# Patient Record
Sex: MALE | Race: WHITE | NOT HISPANIC OR LATINO
[De-identification: names, ages, dates, MRNs, and addresses within clinical notes are randomized per-mention and may not be internally consistent; named-entity substitution may affect disease eponyms.]

---

## 2022-03-02 ENCOUNTER — APPOINTMENT (OUTPATIENT)
Dept: ORTHOPEDIC SURGERY | Facility: CLINIC | Age: 62
End: 2022-03-02
Payer: COMMERCIAL

## 2022-03-02 ENCOUNTER — NON-APPOINTMENT (OUTPATIENT)
Age: 62
End: 2022-03-02

## 2022-03-02 PROBLEM — Z00.00 ENCOUNTER FOR PREVENTIVE HEALTH EXAMINATION: Status: ACTIVE | Noted: 2022-03-02

## 2022-03-02 PROCEDURE — 99204 OFFICE O/P NEW MOD 45 MIN: CPT

## 2022-03-02 NOTE — PHYSICAL EXAM
[de-identified] : General: Alert and oriented x3. In no acute distress. Pleasant in nature with a normal affect. No apparent respiratory distress.\par \par Left Foot Exam\par Skin: Clean, dry, intact\par Inspection: No obvious malalignment, no masses, no swelling, no effusion. Hallux Valgus. Hammertoes. \par Pulses: 2+ DP/PT pulses\par ROM: FOOT Full  ROM of digits, ANKLE 10 degrees of dorsiflexion, 40 degrees of plantarflexion, 10 degrees of subtalar motion.\par Painful ROM: None\par Tenderness: +Pain over the metatarsal heads. No tenderness over the medial malleolus, No tenderness over the lateral malleolus, no CFL/ATFL/PTFL pain, no deltoid ligament pain. No heel pain. No Achilles tenderness. No 5th metatarsal pain. No pain to the LisFranc joint. No ttp over the posterior tibial tendon.\par Stability: Negative anterior/posterior drawer.\par Strength: 5/5 ADD/ABD/TA/GS/EHL/FHL/EDL\par Neuro: Sensation in tact to light touch throughout\par Additional tests: Negative Mortons test, negative tarsal tunnel tinels, negative single heel rise.	 [de-identified] : Xrays of left foot obtained from outside facility on 2021 and reviewed in the office today, 2022 , revealed: Hallux Valgus. Hallux Rigidus. Bipartite sesamoid. No acute fractures. \par \par \par Office Location: [address] [place] [address]\par Office Phone: [phone]\par Office Fax: [phone]\par PATIENT NAME: Korey Thomas\par PATIENT PHONE NUMBER:\par PATIENT ID: [id number]\par : 1960\par DATE OF EXAM: 2021\par R. Phys. Name: NiltonGeoffGaetanoIAN\par R. Phys. Address: [address] [place] [address]\par R. Phys. Phone: [phone]\par MRI-3T LEFT FOOT NON CONTRAST\par \par History: Left foot pain between 2nd and 3rd interspace.\par \par ICD- M79.672 Left foot pain R20.0 Numbness Lower/Upper Extremity M25.675 Left\par foot/toes stiffness R22.42 Swelling, mass, lump left lower limb\par \par Technique: MRI of the left foot was performed with multiple sequences in\par sagittal, coronal and axial planes. There are no prior studies available for\par comparison.\par \par Findings:\par \par BONES:There is no fracture or avascular necrosis. There is no focal osseous\par lesion. There is hallux valgus and bunion formation.\par \par JOINTS: Osteoarthritic changes at the first MP joint are most pronounced at the\par medial metatarsal sesamoid articulation where there is full-thickness cartilage\par loss and remodeling of the articular surfaces. Medial hallux sesamoid is\par bipartite. There is prominent scarring of the medial collateral ligament. There\par is a small joint effusion.\par \par There is near complete tear of the plantar plates the second MP joint. Majority\par of the torn fibers retracted proximally 7 mm. There is prominent bone marrow\par edema at the proximal phalangeal base without a fracture. Collateral ligaments\par are not torn. There is a moderate joint synovitis and pericapsular edema. There\par are mild hammertoe deformity.\par \par Third through fifth MP joints are unremarkable.\par \par There are degenerative changes at the second DIP joint and irregularity at the\par second middle phalangeal head indicative of old impaction fracture.\par \par There is no midfoot arthrosis.The Lisfranc ligament is intact.\par \par TENDONS: Flexor and extensor tendons are normal. Peroneus brevis normally\par inserts along the fifth metatarsal base. Peroneus longus is unremarkable within\par the cuboid tunnel.\par \par WEBSPACES: There is prominent edema within the second interspace and a moderate\par intermetatarsal bursitis.\par \par MISCELLANEOUS: Distal plantar fascia is unremarkable. The visualized\par musculature is unremarkable.There is moderate subcutaneous edema at the\par forefoot.\par \par IMPRESSION:\par \par \par Near complete tear of the plantar plate to the second MP joint with retraction\par of the torn fibers proximally 7 mm. There is prominent bone marrow edema at the\par proximal phalangeal base without a discrete fracture.\par \par Hallux valgus and bunion formation along with osteoarthritic changes at the\par first MP joint. Medial hallux sesamoid is bipartite.\par \par Degenerative changes at the second DIP joint along with irregularity of the\par middle phalangeal head indicative of old impaction fracture.\par \par Signed by: Lonny Erwin\par Signed Date: 2021 9:14 AM EDT\par \par \par \par SIGNED BY: Lonny Erwin M.D., Ext. [id number] 2021 09:14 AM\par \par IMPRESSION:\par \par \par Near complete tear of the plantar plate to the second MP joint with retraction\par of the torn fibers proximally 7 mm. There is prominent bone marrow edema at the\par proximal phalangeal base without a discrete fracture.\par \par Hallux valgus and bunion formation along with osteoarthritic changes at the\par first MP joint. Medial hallux sesamoid is bipartite.\par \par Degenerative changes at the second DIP joint along with irregularity of the\par middle phalangeal head indicative of old impaction fracture.\par \par Signed by: Lonny Erwin\par Signed Date: 2021 9:14 AM EDT\par \par \par \par SIGNED BY: Lonny Erwin M.D., Ext. [id number] 2021 09:14 AM\par

## 2022-03-02 NOTE — DISCUSSION/SUMMARY
[de-identified] : Today I had a lengthy discussion with the patient regarding their left foot pain. I have addressed all the patient's concerns surrounding the pathology of their condition. MRI results were reviewed with the patient today in the clinic. A discussion was had about utilizing custom orthotics. The patient was educated about custom orthotics in the office today. I recommended that the patient utilize Salicylic acid topically PRN for his callus. We discussed utilization of a Pumice stone PRN. \par \par At this time I would like to obtain advanced imaging of the patient's left foot. An MRI was ordered so I can find out more about the etiology of the patient's condition. The patient should follow up with the office after obtaining the MRI. The patient understood and verbally agreed to the treatment plan. All of their questions were answered and they were satisfied with the visit. The patient should call the office if they have any questions or experience worsening symptoms. \par \par Patient is 100% temporarily disabled and will be out of work until 3/16/2022. \par 				\par

## 2022-03-02 NOTE — ADDENDUM
[FreeTextEntry1] : I, Katherine Holloway, acted solely as a scribe for Dr. Carlos Butcher on this date 03/02/2022.\par \par All medical record entries made by the Scribe were at my, Dr. Carlos Butcher, direction and personally dictated by me on 03/02/2022 . I have reviewed the chart and agree that the record accurately reflects my personal performance of the history, physical exam, assessment and plan. I have also personally directed, reviewed, and agreed with the chart.	\par

## 2022-03-02 NOTE — HISTORY OF PRESENT ILLNESS
[FreeTextEntry1] : The patient is a 61 year old male presenting for an initial evaluation of left foot pain with worsening reported since October 2021. The patient cannot attribute their pain to any injury, fall, or trauma. The patient reports that he works at [employer], however he is currently out of work for the past 4 months. Today, the patient is concerned with continual pain localized over the metatarsal heads today. Pain is stated to be worsened with ambulation and with walking for long periods of time. He does report intermittent numbness over the plantar surface of his foot. Current pain scale 6 out of 10. He was previously evaluated for this by his Podiatrist, Dr. Caroline Hylton, who recommended Orthopedic follow up and possible custom orthotic utilization. He is currently being followed by her for Callus treatment. He is here today for a second opinion. He does have an MRI for Nadir from September 2021 to review in the office today. The patient presents wearing a CAM boot. He does have a history of prior smoking. He has no other complaints. \par

## 2022-03-16 ENCOUNTER — APPOINTMENT (OUTPATIENT)
Dept: ORTHOPEDIC SURGERY | Facility: CLINIC | Age: 62
End: 2022-03-16
Payer: COMMERCIAL

## 2022-03-16 PROCEDURE — ZZZZZ: CPT

## 2022-07-01 ENCOUNTER — APPOINTMENT (OUTPATIENT)
Dept: ORTHOPEDIC SURGERY | Facility: CLINIC | Age: 62
End: 2022-07-01

## 2022-09-15 ENCOUNTER — NON-APPOINTMENT (OUTPATIENT)
Age: 62
End: 2022-09-15

## 2022-09-16 ENCOUNTER — APPOINTMENT (OUTPATIENT)
Dept: ORTHOPEDIC SURGERY | Facility: CLINIC | Age: 62
End: 2022-09-16

## 2022-09-16 PROCEDURE — 99212 OFFICE O/P EST SF 10 MIN: CPT

## 2022-09-16 RX ORDER — MELOXICAM 15 MG/1
15 TABLET ORAL DAILY
Qty: 30 | Refills: 0 | Status: ACTIVE | COMMUNITY
Start: 2022-09-16 | End: 1900-01-01

## 2022-09-16 NOTE — HISTORY OF PRESENT ILLNESS
[FreeTextEntry1] : 9/16/22: The patient is here for continued left foot pain from his bunion and hammertoes, left foot.  He states that it is hindering his life due to having pain every day.  He has tried proper shoes, toe spacers, at this point in time he has failed conservative management and he is here to discuss possible surgery in the future.\par \par 3/2/22 The patient is a 61 year old male presenting for an initial evaluation of left foot pain with worsening reported since October 2021. The patient cannot attribute their pain to any injury, fall, or trauma. The patient reports that he works at [employer], however he is currently out of work for the past 4 months. Today, the patient is concerned with continual pain localized over the metatarsal heads today. Pain is stated to be worsened with ambulation and with walking for long periods of time. He does report intermittent numbness over the plantar surface of his foot. Current pain scale 6 out of 10. He was previously evaluated for this by his Podiatrist, Dr. Caroline Hylton, who recommended Orthopedic follow up and possible custom orthotic utilization. He is currently being followed by her for Callus treatment. He is here today for a second opinion. He does have an MRI for Nadir from September 2021 to review in the office today. The patient presents wearing a CAM boot. He does have a history of prior smoking. He has no other complaints. \par

## 2022-09-16 NOTE — DISCUSSION/SUMMARY
[de-identified] : At this time I would like the patient to make a preoperative appointment to meet with Dr. Butcher to discuss left foot bunion correction surgery and hammertoe correction surgery in the near future.  He will speak to the office staff a front to get on me preoperative schedule with Dr. Butcher.  In the interim he will continue with conservative management which includes proper shoewear, a wider toed shoe box, anti-inflammatories and Tylenol for pain.  The patient will continue to work.  The office staff will schedule him for his preoperative appointment with Dr. Butcher.

## 2022-09-16 NOTE — PHYSICAL EXAM
[de-identified] : General: Alert and oriented x3. In no acute distress. Pleasant in nature with a normal affect. No apparent respiratory distress.\par \par Left Foot Exam\par Skin: Clean, dry, intact\par Inspection: No obvious malalignment, no masses, no swelling, no effusion. Hallux Valgus. Hammertoes. \par Pulses: 2+ DP/PT pulses\par ROM: FOOT Full  ROM of digits, ANKLE 10 degrees of dorsiflexion, 40 degrees of plantarflexion, 10 degrees of subtalar motion.\par Painful ROM: None\par Tenderness: +Pain over the metatarsal heads. No tenderness over the medial malleolus, No tenderness over the lateral malleolus, no CFL/ATFL/PTFL pain, no deltoid ligament pain. No heel pain. No Achilles tenderness. No 5th metatarsal pain. No pain to the LisFranc joint. No ttp over the posterior tibial tendon.\par Stability: Negative anterior/posterior drawer.\par Strength: 5/5 ADD/ABD/TA/GS/EHL/FHL/EDL\par Neuro: Sensation in tact to light touch throughout\par Additional tests: Negative Mortons test, negative tarsal tunnel tinels, negative single heel rise.	 [de-identified] : Xrays of left foot obtained from outside facility on 2021 and reviewed, 2022 , revealed: Hallux Valgus. Hallux Rigidus. Bipartite sesamoid. No acute fractures. \par \par \par Office Location: [address] [place] [address]\par Office Phone: [phone]\par Office Fax: [phone]\par PATIENT NAME: Korey Thomas\par PATIENT PHONE NUMBER:\par PATIENT ID: [id number]\par : 1960\par DATE OF EXAM: 2021\par R. Phys. Name: Gaetano Callaway V\par R. Phys. Address: [address] [place] [address]\par R. Phys. Phone: [phone]\par MRI-3T LEFT FOOT NON CONTRAST\par \par History: Left foot pain between 2nd and 3rd interspace.\par \par ICD- M79.672 Left foot pain R20.0 Numbness Lower/Upper Extremity M25.675 Left\par foot/toes stiffness R22.42 Swelling, mass, lump left lower limb\par \par Technique: MRI of the left foot was performed with multiple sequences in\par sagittal, coronal and axial planes. There are no prior studies available for\par comparison.\par \par Findings:\par \par BONES:There is no fracture or avascular necrosis. There is no focal osseous\par lesion. There is hallux valgus and bunion formation.\par \par JOINTS: Osteoarthritic changes at the first MP joint are most pronounced at the\par medial metatarsal sesamoid articulation where there is full-thickness cartilage\par loss and remodeling of the articular surfaces. Medial hallux sesamoid is\par bipartite. There is prominent scarring of the medial collateral ligament. There\par is a small joint effusion.\par \par There is near complete tear of the plantar plates the second MP joint. Majority\par of the torn fibers retracted proximally 7 mm. There is prominent bone marrow\par edema at the proximal phalangeal base without a fracture. Collateral ligaments\par are not torn. There is a moderate joint synovitis and pericapsular edema. There\par are mild hammertoe deformity.\par \par Third through fifth MP joints are unremarkable.\par \par There are degenerative changes at the second DIP joint and irregularity at the\par second middle phalangeal head indicative of old impaction fracture.\par \par There is no midfoot arthrosis.The Lisfranc ligament is intact.\par \par TENDONS: Flexor and extensor tendons are normal. Peroneus brevis normally\par inserts along the fifth metatarsal base. Peroneus longus is unremarkable within\par the cuboid tunnel.\par \par WEBSPACES: There is prominent edema within the second interspace and a moderate\par intermetatarsal bursitis.\par \par MISCELLANEOUS: Distal plantar fascia is unremarkable. The visualized\par musculature is unremarkable.There is moderate subcutaneous edema at the\par forefoot.\par \par IMPRESSION:\par \par \par Near complete tear of the plantar plate to the second MP joint with retraction\par of the torn fibers proximally 7 mm. There is prominent bone marrow edema at the\par proximal phalangeal base without a discrete fracture.\par \par Hallux valgus and bunion formation along with osteoarthritic changes at the\par first MP joint. Medial hallux sesamoid is bipartite.\par \par Degenerative changes at the second DIP joint along with irregularity of the\par middle phalangeal head indicative of old impaction fracture.\par \par Signed by: Lonny Erwin\par Signed Date: 2021 9:14 AM EDT\par \par \par \par SIGNED BY: Lonny Erwin M.D., Ext. [id number] 2021 09:14 AM\par \par IMPRESSION:\par \par \par Near complete tear of the plantar plate to the second MP joint with retraction\par of the torn fibers proximally 7 mm. There is prominent bone marrow edema at the\par proximal phalangeal base without a discrete fracture.\par \par Hallux valgus and bunion formation along with osteoarthritic changes at the\par first MP joint. Medial hallux sesamoid is bipartite.\par \par Degenerative changes at the second DIP joint along with irregularity of the\par middle phalangeal head indicative of old impaction fracture.\par \par Signed by: Lonny Erwin\par Signed Date: 2021 9:14 AM EDT\par \par \par \par SIGNED BY: Lonny Erwin M.D., Ext. [id number] 2021 09:14 AM\par

## 2022-09-21 ENCOUNTER — APPOINTMENT (OUTPATIENT)
Dept: ORTHOPEDIC SURGERY | Facility: CLINIC | Age: 62
End: 2022-09-21

## 2022-09-21 DIAGNOSIS — M21.612 BUNION OF LEFT FOOT: ICD-10-CM

## 2022-09-21 DIAGNOSIS — M20.40 OTHER HAMMER TOE(S) (ACQUIRED), UNSPECIFIED FOOT: ICD-10-CM

## 2022-09-21 DIAGNOSIS — M77.42 METATARSALGIA, LEFT FOOT: ICD-10-CM

## 2022-09-21 PROCEDURE — 99214 OFFICE O/P EST MOD 30 MIN: CPT

## 2022-09-21 PROCEDURE — 73630 X-RAY EXAM OF FOOT: CPT | Mod: 50

## 2022-09-21 NOTE — PHYSICAL EXAM
[de-identified] : General: Alert and oriented x3. In no acute distress. Pleasant in nature with a normal affect. No apparent respiratory distress.\par \par Left Foot Exam\par Skin: Clean, dry, intact\par Inspection: Hallux valgus. Widened forefoot. Hammertoe 2nd, 3rd, 4th toes. Varus fifth toe with a tailor's bunion. Loss of plantar fat. No obvious malalignment, no masses, no swelling, no effusion. Hallux Valgus. Hammertoes. \par Pulses: 2+ DP/PT pulses\par ROM: FOOT Full  ROM of digits, ANKLE 10 degrees of dorsiflexion, 40 degrees of plantarflexion, 10 degrees of subtalar motion.\par Painful ROM: None\par Tenderness: +Pain over the metatarsal heads. No tenderness over the medial malleolus, No tenderness over the lateral malleolus, no CFL/ATFL/PTFL pain, no deltoid ligament pain. No heel pain. No Achilles tenderness. No 5th metatarsal pain. No pain to the LisFranc joint. No ttp over the posterior tibial tendon.\par Stability: Negative anterior/posterior drawer.\par Strength: 5/5 ADD/ABD/TA/GS/EHL/FHL/EDL\par Neuro: Sensation in tact to light touch throughout\par Additional tests: Negative Mortons test, negative tarsal tunnel tinels, negative single heel rise.	\par \par Right Foot Exam\par Skin: Clean, dry, intact\par Inspection: Hallux Valgus. Widened forefoot. No obvious malalignment, no masses, no swelling, no effusion\par Pulses: 2+ DP/PT pulses\par ROM: FOOT Full  ROM of digits, ANKLE 10 degrees of dorsiflexion, 40 degrees of plantarflexion, 10 degrees of subtalar motion.\par Painful ROM: None\par Tenderness: No tenderness over the medial malleolus, No tenderness over the lateral malleolus, no CFL/ATFL/PTFL pain, no deltoid ligament pain. No heel pain. No Achilles tenderness. No 5th metatarsal pain. No pain to the LisFranc joint. No ttp over the posterior tibial tendon.\par Stability: Negative anterior/posterior drawer.\par Strength: 5/5 ADD/ABD/TA/GS/EHL/FHL/EDL\par Neuro: Sensation in tact to light touch throughout\par Additional tests: Negative Mortons test, negative tarsal tunnel tinels, negative single heel rise.			 [de-identified] : X-rays of the bilateral foot were ordered, obtained, and reviewed by me today, 2022, revealed: Hallux valgus bilaterally. Tailor's bunion to the left foot. Acquired hammertoe left foot 2nd, 3rd, 4th toes.\par \par Xrays of left foot obtained from outside facility on 2021 and reviewed, 2022 , revealed: Hallux Valgus. Hallux Rigidus. Bipartite sesamoid. No acute fractures. \par \par Office Location: [address] [place] [address]\par Office Phone: [phone]\par Office Fax: [phone]\par PATIENT NAME: Korey Thomas\par PATIENT PHONE NUMBER:\par PATIENT ID: [id number]\par : 1960\par DATE OF EXAM: 2021\par R. Phys. Name: Gaetano Callaway V\par R. Phys. Address: [address] [place] [address]\par R. Phys. Phone: [phone]\par MRI-3T LEFT FOOT NON CONTRAST\par \par History: Left foot pain between 2nd and 3rd interspace.\par \par ICD- M79.672 Left foot pain R20.0 Numbness Lower/Upper Extremity M25.675 Left\par foot/toes stiffness R22.42 Swelling, mass, lump left lower limb\par \par Technique: MRI of the left foot was performed with multiple sequences in\par sagittal, coronal and axial planes. There are no prior studies available for\par comparison.\par \par Findings:\par \par BONES:There is no fracture or avascular necrosis. There is no focal osseous\par lesion. There is hallux valgus and bunion formation.\par \par JOINTS: Osteoarthritic changes at the first MP joint are most pronounced at the\par medial metatarsal sesamoid articulation where there is full-thickness cartilage\par loss and remodeling of the articular surfaces. Medial hallux sesamoid is\par bipartite. There is prominent scarring of the medial collateral ligament. There\par is a small joint effusion.\par \par There is near complete tear of the plantar plates the second MP joint. Majority\par of the torn fibers retracted proximally 7 mm. There is prominent bone marrow\par edema at the proximal phalangeal base without a fracture. Collateral ligaments\par are not torn. There is a moderate joint synovitis and pericapsular edema. There\par are mild hammertoe deformity.\par \par Third through fifth MP joints are unremarkable.\par \par There are degenerative changes at the second DIP joint and irregularity at the\par second middle phalangeal head indicative of old impaction fracture.\par \par There is no midfoot arthrosis.The Lisfranc ligament is intact.\par \par TENDONS: Flexor and extensor tendons are normal. Peroneus brevis normally\par inserts along the fifth metatarsal base. Peroneus longus is unremarkable within\par the cuboid tunnel.\par \par WEBSPACES: There is prominent edema within the second interspace and a moderate\par intermetatarsal bursitis.\par \par MISCELLANEOUS: Distal plantar fascia is unremarkable. The visualized\par musculature is unremarkable.There is moderate subcutaneous edema at the\par forefoot.\par \par IMPRESSION:\par \par \par Near complete tear of the plantar plate to the second MP joint with retraction\par of the torn fibers proximally 7 mm. There is prominent bone marrow edema at the\par proximal phalangeal base without a discrete fracture.\par \par Hallux valgus and bunion formation along with osteoarthritic changes at the\par first MP joint. Medial hallux sesamoid is bipartite.\par \par Degenerative changes at the second DIP joint along with irregularity of the\par middle phalangeal head indicative of old impaction fracture.\par \par Signed by: Lonny Erwin\par Signed Date: 2021 9:14 AM EDT\par \par \par \par SIGNED BY: Lonny Erwin M.D., Ext. [id number] 2021 09:14 AM\par \par IMPRESSION:\par \par \par Near complete tear of the plantar plate to the second MP joint with retraction\par of the torn fibers proximally 7 mm. There is prominent bone marrow edema at the\par proximal phalangeal base without a discrete fracture.\par \par Hallux valgus and bunion formation along with osteoarthritic changes at the\par first MP joint. Medial hallux sesamoid is bipartite.\par \par Degenerative changes at the second DIP joint along with irregularity of the\par middle phalangeal head indicative of old impaction fracture.\par \par Signed by: Lonny Erwin\par Signed Date: 2021 9:14 AM EDT

## 2022-09-21 NOTE — HISTORY OF PRESENT ILLNESS
[FreeTextEntry1] : 9/21/2022: The patient is a 62 year old male presenting for a follow-up evaluation of his left foot. He presents today for possible surgical discussion in regards to his left foot. The patient is reporting bilateral foot pain in the office today. The patient reports that he walks for over 20,000 steps daily and stands for long periods of time with pain. The patient describes the timing of his pain as constant, present when in work and when not working. He is wearing sneakers and is walking without assistance. History of bilateral foot surgery. The patient states that he is a former smoker, reporting that he quit three years prior. No other complaints. \par \par 9/16/22: The patient is here for continued left foot pain from his bunion and hammertoes, left foot.  He states that it is hindering his life due to having pain every day.  He has tried proper shoes, toe spacers, at this point in time he has failed conservative management and he is here to discuss possible surgery in the future.\par \par 3/2/22 The patient is a 61 year old male presenting for an initial evaluation of left foot pain with worsening reported since October 2021. The patient cannot attribute their pain to any injury, fall, or trauma. The patient reports that he works at [employer], however he is currently out of work for the past 4 months. Today, the patient is concerned with continual pain localized over the metatarsal heads today. Pain is stated to be worsened with ambulation and with walking for long periods of time. He does report intermittent numbness over the plantar surface of his foot. Current pain scale 6 out of 10. He was previously evaluated for this by his Podiatrist, Dr. Caroline Hylton, who recommended Orthopedic follow up and possible custom orthotic utilization. He is currently being followed by her for Callus treatment. He is here today for a second opinion. He does have an MRI for Nadir from September 2021 to review in the office today. The patient presents wearing a CAM boot. He does have a history of prior smoking. He has no other complaints. \par

## 2022-09-21 NOTE — ADDENDUM
[FreeTextEntry1] : I, Katherine Holloway, acted solely as a scribe for Dr. Carlos Butcher on this date 09/21/2022.\par \par All medical record entries made by the Scribe were at my, Dr. Carlos Butcher, direction and personally dictated by me on 09/21/2022. I have reviewed the chart and agree that the record accurately reflects my personal performance of the history, physical exam, assessment and plan. I have also personally directed, reviewed, and agreed with the chart.	\par

## 2022-09-21 NOTE — DISCUSSION/SUMMARY
[de-identified] : Today I had a lengthy discussion with the patient regarding their left foot pain. I have addressed all the patient's concerns surrounding the pathology of their condition. XR imaging was completed in office today and results were reviewed with the patient. We discussed both operative and non-operative treatment options in great detail. A lengthy discussion was had about the surgery. All risks, benefits and alternatives to the recommended surgical procedure were discussed which include but are not limited to bleeding, infection, nerve damage, vascular damage, failure of the wound to heal, the need for further surgery, loss of limb, DVT, PE, loss of life as well as the risks associated with general anesthesia.The patient verbalized understanding and provided informed consent to move forward with surgery.  At this time, I recommended medical optimization with the patient's PCP and followup with a family member post evaluation and post employer discussion. Recommended discussion with employer as well. A discussion was also had about shoe-wear modifications. I advised the patient to utilize a wide toed cross training sneaker that better accommodates the feet. I recommended New Balance, Tovar, Hoka, Asics, or [employer] to the patient. The patient understood and verbally agreed to the treatment plan. All of their questions were answered and they were satisfied with the visit.\par \par Left sided Hallux Valgus MIS with Hammertoe and Tailor's bunion correction. 	\par \par Patient will remain out of work due to his foot. Note was provided. Preoperative clearance and bloodwork will be scheduled in the future. \par 			\par

## 2022-10-13 DIAGNOSIS — Z01.818 ENCOUNTER FOR OTHER PREPROCEDURAL EXAMINATION: ICD-10-CM

## 2022-10-20 ENCOUNTER — APPOINTMENT (OUTPATIENT)
Dept: FAMILY MEDICINE | Facility: CLINIC | Age: 62
End: 2022-10-20

## 2022-10-25 ENCOUNTER — APPOINTMENT (OUTPATIENT)
Dept: ORTHOPEDIC SURGERY | Facility: HOSPITAL | Age: 62
End: 2022-10-25